# Patient Record
Sex: FEMALE | Race: WHITE | URBAN - METROPOLITAN AREA
[De-identification: names, ages, dates, MRNs, and addresses within clinical notes are randomized per-mention and may not be internally consistent; named-entity substitution may affect disease eponyms.]

---

## 2021-07-13 NOTE — PROGRESS NOTES
Christen Rodriguez  1960   Chief Complaint   Patient presents with    Shoulder Pain     left shoulder pain        HISTORY OF PRESENT ILLNESS  Christen Rodriguez is a 63 y.o. female who presents today for evaluation of left shoulder pain and tingling of the 4th and 5th digits on the left hand. She reports having the pain for years and does not know of an injury or event that brought it on. Her pain is achy and located on the anterior and lateral aspect of her shoulder. This pain does not radiate and she can not pinpoint and particular movement that makes it works. The pain is intermittent and she has taken motrin in the past for this. She has also been through PT in the past for her shoulder. She is currently on Celebrex and doesn't notice much difference in her pain since starting this. She denies any sx or injections for the shoulder. She has been seen by her PCP for her shoulder and neck. xrays were done and she comes in for evaluation today to see what is going on with her shoulder. She is also having tingling of the 4th and 5th digits of the left hand. She has also had this for years and doesn't know when or how it started. The tingling is intermittent and she does have symptoms at night. She has not tried anything to help this so far. Allergies   Allergen Reactions    Vicodin [Hydrocodone-Acetaminophen] Other (comments)     Tongue tingling        History reviewed. No pertinent past medical history.    Social History     Socioeconomic History    Marital status:      Spouse name: Not on file    Number of children: Not on file    Years of education: Not on file    Highest education level: Not on file   Occupational History    Not on file   Tobacco Use    Smoking status: Never Smoker    Smokeless tobacco: Never Used   Substance and Sexual Activity    Alcohol use: Not on file    Drug use: Not on file    Sexual activity: Not on file   Other Topics Concern    Not on file   Social History Narrative    Not on file     Social Determinants of Health     Financial Resource Strain:     Difficulty of Paying Living Expenses:    Food Insecurity:     Worried About Running Out of Food in the Last Year:     [address] in the Last Year:    Transportation Needs:     Lack of Transportation (Medical):  Lack of Transportation (Non-Medical):    Physical Activity:     Days of Exercise per Week:     Minutes of Exercise per Session:    Stress:     Feeling of Stress :    Social Connections:     Frequency of Communication with Friends and Family:     Frequency of Social Gatherings with Friends and Family:     Attends [employer] Services:     Active Member of Clubs or Organizations:     Attends Club or Organization Meetings:     Marital Status:    Intimate Partner Violence:     Fear of Current or Ex-Partner:     Emotionally Abused:     Physically Abused:     Sexually Abused:       Past Surgical History:   Procedure Laterality Date    HX HYSTERECTOMY        No family history on file. Current Outpatient Medications   Medication Sig    lisinopriL (PRINIVIL, ZESTRIL) 40 mg tablet Take 40 mg by mouth daily.  hydroCHLOROthiazide (HYDRODIURIL) 25 mg tablet Take 25 mg by mouth daily.  docusate calcium (Surfak) 240 mg capsule Take 240 mg by mouth two (2) times a day.  polyethylene glycol (MIRALAX) 17 gram packet Take 17 g by mouth daily.  celecoxib (CELEBREX) 100 mg capsule Take  by mouth two (2) times a day.  sodium chloride (AYR SALINE) 0.65 % drop 2 Drops every two (2) hours as needed for Congestion.  diclofenac (Voltaren) 1 % gel Apply 4 g to affected area four (4) times daily. No current facility-administered medications for this visit. REVIEW OF SYSTEM   Patient denies: Weight loss, Fever/Chills, HA, Visual changes, Fatigue, Chest pain, SOB, Abdominal pain, N/V/D/C, Blood in stool or urine, Edema. Pertinent positive as above in HPI.  All others were negative    PHYSICAL EXAM:   Visit Vitals  Pulse 82   Temp 98.3 °F (36.8 °C) (Temporal)   Resp 16   Ht 5' 1\" (1.549 m)   Wt 140 lb 12.8 oz (63.9 kg)   SpO2 97%   BMI 26.60 kg/m²     The patient is a well-developed, well-nourished female   in no acute distress. The patient is alert and oriented times three. The patient is alert and oriented times three. Mood and affect are normal.  LYMPHATIC: lymph nodes are not enlarged and are within normal limits  SKIN: normal in color and non tender to palpation. There are no bruises or abrasions noted. NEUROLOGICAL: Motor sensory exam is within normal limits. Reflexes are equal bilaterally. There is normal sensation to pinprick and light touch  MUSCULOSKELETAL:  Examination Left shoulder   Skin Intact   AC joint tenderness -   Biceps tenderness -   Forward flexion/Elevation    Active abduction    Glenohumeral abduction 90   External rotation ROM 90   Internal rotation ROM 70   Apprehension Not assessed   Vidas Relocation Not assessed   Jerk Not assessed   Load and Shift Not assessed   Obriens Not assessed   Speeds -   Impingement sign -   Supraspinatus/Empty Can ++, 5/5   External Rotation Strength +, 5/5   Lift Off/Belly Press -, 5/5   Neurovascular Intact      Examination Left Elbow   Skin Intact   Range of Motion 0-135   Tenderness Medial Epicondyle -   Tenderness Lateral Epicondyle -   Tenderness Olecranon Bursa -   Swelling -   Bruising -   Stability Normal   Motor Strength  Normal   Neurovascular Intact     tinels and phalens pos at the cubital tunnel    IMAGING: no new imaging today. 4 views of the left shoulder from PCP office reviewed today. Which do some calcific tendonitis of the supraspinatus. IMPRESSION:      ICD-10-CM ICD-9-CM    1.  Calcific tendonitis of left shoulder  M75.32 726.11 MRI SHOULDER LT WO CONT        PLAN:   Pt having chronic left shoulder pain and cubital tunnel syndrome  I think her shoulder pain is coming from calcific tendonitis. I think she is also having some cubital tunnel symptoms as well. Will order an MRI of her left shoulder to evaluate the calcific tendonitis and possibly plan for surgery vs injection for this. She will continue Celebrex and activity modification for discomfort. Will prescribe volteran gel to also help with pain. For the cubital tunnel will work on activity modification for now. If symptoms persists may order some PT and nerve gliding exercises vs EMG if surgical planning decided on.   RTC after MRI      JOSÉ MIGUEL [facility]

## 2021-07-14 ENCOUNTER — OFFICE VISIT (OUTPATIENT)
Dept: ORTHOPEDIC SURGERY | Age: 61
End: 2021-07-14
Payer: OTHER GOVERNMENT

## 2021-07-14 VITALS
HEART RATE: 82 BPM | RESPIRATION RATE: 16 BRPM | TEMPERATURE: 98.3 F | HEIGHT: 61 IN | BODY MASS INDEX: 26.58 KG/M2 | WEIGHT: 140.8 LBS | OXYGEN SATURATION: 97 %

## 2021-07-14 DIAGNOSIS — G56.22 CUBITAL TUNNEL SYNDROME ON LEFT: ICD-10-CM

## 2021-07-14 DIAGNOSIS — M75.32 CALCIFIC TENDONITIS OF LEFT SHOULDER: Primary | ICD-10-CM

## 2021-07-14 DIAGNOSIS — M75.32 CALCIFIC TENDONITIS OF LEFT SHOULDER: ICD-10-CM

## 2021-07-14 PROCEDURE — 99204 OFFICE O/P NEW MOD 45 MIN: CPT | Performed by: ORTHOPAEDIC SURGERY

## 2021-07-14 RX ORDER — DICLOFENAC SODIUM 10 MG/G
4 GEL TOPICAL 4 TIMES DAILY
Qty: 100 G | Refills: 4 | Status: SHIPPED | OUTPATIENT
Start: 2021-07-14 | End: 2022-10-30

## 2021-07-14 RX ORDER — HYDROCHLOROTHIAZIDE 25 MG/1
25 TABLET ORAL DAILY
COMMUNITY

## 2021-07-14 RX ORDER — DOCUSATE CALCIUM 240 MG
240 CAPSULE ORAL 2 TIMES DAILY
COMMUNITY
End: 2022-10-30

## 2021-07-14 RX ORDER — POLYETHYLENE GLYCOL 3350 17 G/17G
17 POWDER, FOR SOLUTION ORAL DAILY
COMMUNITY

## 2021-07-14 RX ORDER — LISINOPRIL 40 MG/1
40 TABLET ORAL DAILY
COMMUNITY
End: 2022-01-11

## 2021-07-14 RX ORDER — CELECOXIB 100 MG/1
CAPSULE ORAL 2 TIMES DAILY
COMMUNITY

## 2022-01-11 ENCOUNTER — OFFICE VISIT (OUTPATIENT)
Dept: ORTHOPEDIC SURGERY | Age: 62
End: 2022-01-11
Payer: OTHER GOVERNMENT

## 2022-01-11 VITALS
TEMPERATURE: 97.7 F | WEIGHT: 142.2 LBS | OXYGEN SATURATION: 100 % | HEIGHT: 61 IN | BODY MASS INDEX: 26.85 KG/M2 | HEART RATE: 77 BPM

## 2022-01-11 DIAGNOSIS — M75.32 CALCIFIC TENDONITIS OF LEFT SHOULDER: Primary | ICD-10-CM

## 2022-01-11 DIAGNOSIS — M75.101 ROTATOR CUFF SYNDROME, RIGHT: ICD-10-CM

## 2022-01-11 PROCEDURE — 99214 OFFICE O/P EST MOD 30 MIN: CPT | Performed by: ORTHOPAEDIC SURGERY

## 2022-01-11 RX ORDER — TELMISARTAN 40 MG/1
40 TABLET ORAL DAILY
COMMUNITY

## 2022-01-11 NOTE — PROGRESS NOTES
Patient: Brittany Monroy                MRN: [id number]       SSN: [id number]  YOB: 1960        AGE: 63 y.o. SEX: female  Body mass index is 26.87 kg/m². PCP: Madhu Hughes NP  01/11/22    CHIEF COMPLAINT: Bilateral shoulder pain     HPI: Brittany Monroy is a 63 y.o. female patient who presents to the office today with bilateral shoulder pain. She has been having left shoulder pain for the past 5 to 6 months. She did have an exacerbation of her symptoms about 4 months ago. She has had x-rays and an MRI. The left shoulder pain is more chronic and has resolved for the most part with physical therapy. She has not had any physical therapy on the right shoulder she is having a similar type pain in the right shoulder. Pain hurts worse with use especially overhead and swimming. She also notes the pain at night occasionally with sleeping. Past Medical History:   Diagnosis Date    Asthma     Hypertension        History reviewed. No pertinent family history. Current Outpatient Medications   Medication Sig Dispense Refill    telmisartan (Micardis) 40 mg tablet Take 40 mg by mouth daily.  hydroCHLOROthiazide (HYDRODIURIL) 25 mg tablet Take 25 mg by mouth daily.  docusate calcium (Surfak) 240 mg capsule Take 240 mg by mouth two (2) times a day.  polyethylene glycol (MIRALAX) 17 gram packet Take 17 g by mouth daily.  celecoxib (CELEBREX) 100 mg capsule Take  by mouth two (2) times a day.  sodium chloride (AYR SALINE) 0.65 % drop 2 Drops every two (2) hours as needed for Congestion.  diclofenac (Voltaren) 1 % gel Apply 4 g to affected area four (4) times daily. 100 g 4    lisinopriL (PRINIVIL, ZESTRIL) 40 mg tablet Take 40 mg by mouth daily.  (Patient not taking: Reported on 1/11/2022)         Allergies   Allergen Reactions    Seasonale [Levonorgestrel-Ethinyl Estrad] Unknown (comments)    Vicodin [Hydrocodone-Acetaminophen] Other (comments) Tongue tingling       Past Surgical History:   Procedure Laterality Date    HX HYSTERECTOMY      OH REVAGINAL PROLAPSE,UTEROSACRAL         Social History     Socioeconomic History    Marital status:      Spouse name: Not on file    Number of children: Not on file    Years of education: Not on file    Highest education level: Not on file   Occupational History    Not on file   Tobacco Use    Smoking status: Never Smoker    Smokeless tobacco: Never Used   Vaping Use    Vaping Use: Never used   Substance and Sexual Activity    Alcohol use: Not on file    Drug use: Not on file    Sexual activity: Not on file   Other Topics Concern    Not on file   Social History Narrative    Not on file     Social Determinants of Health     Financial Resource Strain:     Difficulty of Paying Living Expenses: Not on file   Food Insecurity:     Worried About Running Out of Food in the Last Year: Not on file    Loki of Food in the Last Year: Not on file   Transportation Needs:     Lack of Transportation (Medical): Not on file    Lack of Transportation (Non-Medical):  Not on file   Physical Activity:     Days of Exercise per Week: Not on file    Minutes of Exercise per Session: Not on file   Stress:     Feeling of Stress : Not on file   Social Connections:     Frequency of Communication with Friends and Family: Not on file    Frequency of Social Gatherings with Friends and Family: Not on file    Attends Anabaptist Services: Not on file    Active Member of Clubs or Organizations: Not on file    Attends Club or Organization Meetings: Not on file    Marital Status: Not on file   Intimate Partner Violence:     Fear of Current or Ex-Partner: Not on file    Emotionally Abused: Not on file    Physically Abused: Not on file    Sexually Abused: Not on file   Housing Stability:     Unable to Pay for Housing in the Last Year: Not on file    Number of Places Lived in the Last Year: Not on file    Unstable Housing in the Last Year: Not on file       REVIEW OF SYSTEMS:      CON: negative for recent weight loss/gain, fever, or chills  EYE: negative for double or blurry vision  ENT: negative for hoarseness  RS:   negative for cough, URI, SOB  CV:  negative for chest pain, palpitations  GI:    negative for blood in stool, nausea/vomiting  :  negative for blood in urine  MS: As per HPI  Other systems reviewed and noted below. PHYSICAL EXAMINATION:  Visit Vitals  Pulse 77   Temp 97.7 °F (36.5 °C) (Temporal)   Ht 5' 1\" (1.549 m)   Wt 142 lb 3.2 oz (64.5 kg)   SpO2 100%   BMI 26.87 kg/m²     Body mass index is 26.87 kg/m². GENERAL: Alert and oriented x3, in no acute distress, well-developed, well-nourished. HEENT: Normocephalic, atraumatic. RESP: Non labored breathing with equal chest rise on inspiration. CV: Well perfused extremities. No cyanosis or clubbing noted. ABDOMEN: Soft, non-tender, non-distended. Shoulder Examination     R   L  ROM   FF  Full   Full  ER  Full   Full   IR  Full   Full  Rotator Cuff Pain   Supra  -   +   Infra  -   -   Subscap -   -  Crepitus  -   -  Effusion  -   -  Warmth  -   -   Erythema  -   -  Instability  -   -  AC Joint TTP  -   -  Clavicle   Deformity -   -   TTP  -   -  Proximal Humerus   Deformity -   -   TTP  -   -  Deltoid Strength 5   5  Biceps Strength 5   5  Biceps Deformity -   -  Biceps Groove Pain -   -  Impingement Sign -   -       IMAGING:  Previously taken x-rays were reviewed in the office which show calcific tendinitis of the rotator cuff    An MRI of the left shoulder was also reviewed. This does not show any full-thickness rotator cuff tears. There is calcific deposit in the infraspinatus tendon    ASSESSMENT & PLAN  Diagnosis: Left shoulder calcific tendinitis, right shoulder pain    Ben Pares likely has rotator cuff pathology on both shoulders. I do not have any imaging and did not get any today of her right shoulder as her symptoms are very mild.   Her left shoulder has known calcific tendinitis which had a flare several months ago but has seemed to settle down with physical therapy and conservative treatment. At this time I would continue with conservative treatment and instructed her to continue with home exercise program.  I will plan to see her back as needed should her symptoms worsen. Electronically signed by: Jhony Antonio MD    Note: This note was completed using voice recognition software.   Any typographical/name errors or mistakes are unintentional.

## 2022-10-19 ENCOUNTER — HOSPITAL ENCOUNTER (OUTPATIENT)
Dept: LAB | Age: 62
Discharge: HOME | End: 2022-10-19
Payer: OTHER GOVERNMENT

## 2022-10-19 ENCOUNTER — OFFICE VISIT (OUTPATIENT)
Dept: HEMATOLOGY | Age: 62
End: 2022-10-19
Payer: OTHER GOVERNMENT

## 2022-10-19 VITALS
OXYGEN SATURATION: 99 % | SYSTOLIC BLOOD PRESSURE: 123 MMHG | DIASTOLIC BLOOD PRESSURE: 78 MMHG | TEMPERATURE: 98 F | BODY MASS INDEX: 27.19 KG/M2 | HEART RATE: 101 BPM | WEIGHT: 144 LBS | HEIGHT: 61 IN

## 2022-10-19 DIAGNOSIS — K76.89 LIVER CYST: ICD-10-CM

## 2022-10-19 DIAGNOSIS — K76.89 LIVER CYST: Primary | ICD-10-CM

## 2022-10-19 LAB
ALBUMIN SERPL-MCNC: 3.8 G/DL (ref 3.4–5)
ALBUMIN/GLOB SERPL: 1.5 {RATIO} (ref 0.8–1.7)
ALP SERPL-CCNC: 67 U/L (ref 45–117)
ALT SERPL-CCNC: 26 U/L (ref 13–56)
ANION GAP SERPL CALC-SCNC: 7 MMOL/L (ref 3–18)
AST SERPL-CCNC: 22 U/L (ref 10–38)
BASOPHILS # BLD: 0 K/UL (ref 0–0.1)
BASOPHILS NFR BLD: 1 % (ref 0–2)
BILIRUB DIRECT SERPL-MCNC: 0.1 MG/DL (ref 0–0.2)
BILIRUB SERPL-MCNC: 0.5 MG/DL (ref 0.2–1)
BUN SERPL-MCNC: 13 MG/DL (ref 7–18)
BUN/CREAT SERPL: 23 (ref 12–20)
CALCIUM SERPL-MCNC: 9 MG/DL (ref 8.5–10.1)
CHLORIDE SERPL-SCNC: 105 MMOL/L (ref 100–111)
CO2 SERPL-SCNC: 29 MMOL/L (ref 21–32)
CREAT SERPL-MCNC: 0.56 MG/DL (ref 0.6–1.3)
DIFFERENTIAL METHOD BLD: ABNORMAL
EOSINOPHIL # BLD: 0.3 K/UL (ref 0–0.4)
EOSINOPHIL NFR BLD: 6 % (ref 0–5)
ERYTHROCYTE [DISTWIDTH] IN BLOOD BY AUTOMATED COUNT: 12.5 % (ref 11.6–14.5)
GLOBULIN SER CALC-MCNC: 2.6 G/DL (ref 2–4)
GLUCOSE SERPL-MCNC: 85 MG/DL (ref 74–99)
HCT VFR BLD AUTO: 40.1 % (ref 35–45)
HGB BLD-MCNC: 13 G/DL (ref 12–16)
IMM GRANULOCYTES # BLD AUTO: 0 K/UL (ref 0–0.04)
IMM GRANULOCYTES NFR BLD AUTO: 1 % (ref 0–0.5)
INR PPP: 1 (ref 0.8–1.2)
LYMPHOCYTES # BLD: 1.4 K/UL (ref 0.9–3.6)
LYMPHOCYTES NFR BLD: 29 % (ref 21–52)
MCH RBC QN AUTO: 26.5 PG (ref 24–34)
MCHC RBC AUTO-ENTMCNC: 32.4 G/DL (ref 31–37)
MCV RBC AUTO: 81.8 FL (ref 78–100)
MONOCYTES # BLD: 0.4 K/UL (ref 0.05–1.2)
MONOCYTES NFR BLD: 8 % (ref 3–10)
NEUTS SEG # BLD: 2.6 K/UL (ref 1.8–8)
NEUTS SEG NFR BLD: 56 % (ref 40–73)
NRBC # BLD: 0 K/UL (ref 0–0.01)
NRBC BLD-RTO: 0 PER 100 WBC
PLATELET # BLD AUTO: 179 K/UL (ref 135–420)
PMV BLD AUTO: 10.3 FL (ref 9.2–11.8)
POTASSIUM SERPL-SCNC: 3 MMOL/L (ref 3.5–5.5)
PROT SERPL-MCNC: 6.4 G/DL (ref 6.4–8.2)
PROTHROMBIN TIME: 13.5 SEC (ref 11.5–15.2)
RBC # BLD AUTO: 4.9 M/UL (ref 4.2–5.3)
SODIUM SERPL-SCNC: 141 MMOL/L (ref 136–145)
WBC # BLD AUTO: 4.8 K/UL (ref 4.6–13.2)

## 2022-10-19 PROCEDURE — 85025 COMPLETE CBC W/AUTO DIFF WBC: CPT

## 2022-10-19 PROCEDURE — 80076 HEPATIC FUNCTION PANEL: CPT

## 2022-10-19 PROCEDURE — 82378 CARCINOEMBRYONIC ANTIGEN: CPT

## 2022-10-19 PROCEDURE — 86301 IMMUNOASSAY TUMOR CA 19-9: CPT

## 2022-10-19 PROCEDURE — 87340 HEPATITIS B SURFACE AG IA: CPT

## 2022-10-19 PROCEDURE — 86803 HEPATITIS C AB TEST: CPT

## 2022-10-19 PROCEDURE — 85610 PROTHROMBIN TIME: CPT

## 2022-10-19 PROCEDURE — 80048 BASIC METABOLIC PNL TOTAL CA: CPT

## 2022-10-19 PROCEDURE — 99204 OFFICE O/P NEW MOD 45 MIN: CPT | Performed by: INTERNAL MEDICINE

## 2022-10-19 PROCEDURE — 82107 ALPHA-FETOPROTEIN L3: CPT

## 2022-10-19 PROCEDURE — 36415 COLL VENOUS BLD VENIPUNCTURE: CPT

## 2022-10-19 RX ORDER — DOXYCYCLINE 50 MG/1
50 CAPSULE ORAL 2 TIMES DAILY
COMMUNITY

## 2022-10-19 RX ORDER — POTASSIUM CHLORIDE 750 MG/1
TABLET, FILM COATED, EXTENDED RELEASE ORAL
COMMUNITY

## 2022-10-19 RX ORDER — ACETAMINOPHEN 500 MG
TABLET ORAL DAILY
COMMUNITY

## 2022-10-19 RX ORDER — FEXOFENADINE HCL 60 MG
TABLET ORAL
COMMUNITY

## 2022-10-19 RX ORDER — FLUTICASONE PROPIONATE 100 UG/1
1 POWDER, METERED RESPIRATORY (INHALATION) 2 TIMES DAILY
COMMUNITY

## 2022-10-19 NOTE — PROGRESS NOTES
[facility], MD, FACP, Brett Danielle Gerard, [place]      Tee JOSÉ MIGUEL Watson, [facility] Center[facility]   Rosa Kaiser, [facility]   Saida Jama, FNFILIPPO Kessler FNP-C    Yolande Jarrell, [facility]       Samantha Stock Gonzalo De Henao [address]    at [address], [address] [place] [address], Robert  22.    [phone]    FAX: [address] [place] [address], [address], [address]    [phone]    FAX: [phone]       Patient Care Team:  Albino Santos MD (Surgical Oncology)      Problem List  Date Reviewed: 10/30/2022            Codes Class Noted    Liver cyst ICD-10-CM: K76.89  ICD-9-CM: 573.8  10/30/2022             The clinicians listed above have asked me to see Dave Ramey in consultation regarding a large liver cyst.  The only data I was provided was a MRI report. The patient is a 63 y.o.  female without any history of previous liver disease. She developed SOB and saw pulmonary physician I 5/2022. Spirometry of the lungs was normal.  A Ct scan of the chest demonstrated a liver cyst.    The most recent imaging study was an MRI in 6/2022. This demonstrated a large cyst occupying and replacing the entire let lobe. 6.1 x 9.0 cm and several smaller cysts. She had a cholecystectomy in 8/2012. Imaging prior that operation demonstrated a liver cyst.      No liver cancer makers have been ordered to date or the results are not available to me. The patient has the following symptoms which could be attributed to the liver disorder:    Mid-Back pain.       The patient is not experiencing the following symptoms which are commonly seen in this liver disorder:   fatigue,   pain in the right side over the liver,     The patient completes all daily activities without any functional limitations. ASSESSMENT AND PLAN:  Liver Cyst  The patient has a large cyst which is replacing the entire left lobe of the liver. There are also a few small cysts in the right lobe. Liver transaminases are normal.  ALP is normal.  Liver function is normal.  The platelet count is normal.      Serologic testing for causes of chronic liver disease was negative for HCV, HBV    The patient is complaining of back pain and this is most likely coming from that large cyst.  Given the size, that it is now outside the boundies of the liver it will most likely continue to enlarge. I will refer him to Dr Isai Deleon or [facility] for surgical resection of the entire left lobe/cyst.     Will measure the following serologic cancer markers AFP. CA 19-9. CEA. These were negative. CA 19-9 is pending. Treatment of other medical problems in patients with chronic liver disease  There are no contraindications for the patient to take most medications that are necessary for treatment of other medical issues. Counseling for alcohol in patients with chronic liver disease  The patient was counseled regarding alcohol consumption and the effect of alcohol on chronic liver disease. Vaccinations   Since the patient does not have a chronic liver disease which can lead to liver injury screening for HAV and HBV is not needed. Routine vaccinations against other bacterial and viral agents can be performed as indicated. Annual flu vaccination should be administered if indicated. ALLERGIES  Allergies   Allergen Reactions    Seasonale [Levonorgestrel-Ethinyl Estrad] Unknown (comments)    Vicodin [Hydrocodone-Acetaminophen] Other (comments)     Tongue tingling       MEDICATIONS  Current Outpatient Medications   Medication Sig    fexofenadine (ALLEGRA) 60 mg tablet Take  by mouth. cholecalciferol (VITAMIN D3) (2,000 UNITS /50 MCG) cap capsule Take  by mouth daily.     potassium chloride SR (KLOR-CON 10) 10 mEq tablet Take  by mouth. doxycycline (MONODOX) 50 mg capsule Take 50 mg by mouth two (2) times a day. fluticasone propionate (Flovent Diskus) 100 mcg/actuation dsdv inhaler Take 1 Puff by inhalation two (2) times a day. telmisartan (MICARDIS) 40 mg tablet Take 40 mg by mouth daily. hydroCHLOROthiazide (HYDRODIURIL) 25 mg tablet Take 25 mg by mouth daily. polyethylene glycol (MIRALAX) 17 gram packet Take 17 g by mouth daily. celecoxib (CELEBREX) 100 mg capsule Take  by mouth two (2) times a day. sodium chloride (AYR SALINE) 0.65 % drop 2 Drops every two (2) hours as needed for Congestion. No current facility-administered medications for this visit. SYSTEM REVIEW NOT RELATED TO LIVER DISEASE OR REVIEWED ABOVE:  Constitution systems: Negative for fever, chills, weight gain, weight loss. Eyes: Negative for visual changes. ENT: Negative for sore throat, painful swallowing. Respiratory: Negative for cough, hemoptysis, SOB. Cardiology: Negative for chest pain, palpitations. GI:  Negative for constipation or diarrhea. : Negative for urinary frequency, dysuria, hematuria, nocturia. Skin: Negative for rash. Hematology: Negative for easy bruising, blood clots. Musculo-skelatal: Negative for back pain, muscle pain, weakness. Neurologic: Negative for headaches, dizziness, vertigo, memory problems not related to HE. Psychology: Negative for anxiety, depression. FAMILY HISTORY:  The father  of CVA. The mother Has/had the following chronic disease(s): fatty liver, CVA. SOCIAL HISTORY:  The patient is . The patient has 1 child. The patient has never used tobacco products. The patient has never consumed significant amounts of alcohol. 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        PHYSICAL EXAMINATION:  Visit Vitals  /78   Pulse (!) 101   Temp 98 °F (36.7 °C) (Tympanic)   Ht 5' 1\" (1.549 m) Wt 144 lb (65.3 kg)   SpO2 99%   BMI 27.21 kg/m²       General: No acute distress. Eyes: Sclera anicteric. ENT: No oral lesions. Thyroid normal.  Nodes: No adenopathy. Skin: No spider angiomata. No jaundice. No palmar erythema. Respiratory: Lungs clear to auscultation. Cardiovascular: Regular heart rate. No murmurs. No JVD. Abdomen: Soft non-tender, liver size normal to percussion/palpation. Spleen not palpable. No obvious ascites. Extremities: No edema. No muscle wasting. No gross arthritic changes. Neurologic: Alert and oriented. Cranial nerves grossly intact. No asterixis. LABORATORY STUDIES:  Liver [place] of [address] Units 10/19/2022   WBC 4.6 - 13.2 K/uL 4.8   ANC 1.8 - 8.0 K/UL 2.6   HGB 12.0 - 16.0 g/dL 13.0    - 420 K/uL 179   INR 0.8 - 1.2   1.0   AST 10 - 38 U/L 22   ALT 13 - 56 U/L 26   Alk Phos 45 - 117 U/L 67   Bili, Total 0.2 - 1.0 MG/DL 0.5   Bili, Direct 0.0 - 0.2 MG/DL 0.1   Albumin 3.4 - 5.0 g/dL 3.8   BUN 7.0 - 18 MG/DL 13   Creat 0.6 - 1.3 MG/DL 0.56 (L)   Na 136 - 145 mmol/L 141   K 3.5 - 5.5 mmol/L 3.0 (L)   Cl 100 - 111 mmol/L 105   CO2 21 - 32 mmol/L 29   Glucose 74 - 99 mg/dL 85     Cancer Screening Latest Ref Rng & Units 10/19/2022   AFP, Serum 0.0 - 9.2 ng/mL 2.6   AFP-L3% 0.0 - 9.9 % Comment   CEA ng/mL 0.6     10/2022.  pending      SEROLOGIES:  Serologies Latest Ref Rng & Units 10/19/2022   Hep B Surface Ag <1.00 Index <0.10   Hep B Surface Ag Interp NEG   Negative   Hep C Ab 0.0 - 0.9 s/co ratio <0.1       LIVER HISTOLOGY:  Not available or performed    ENDOSCOPIC PROCEDURES:  Not available or performed    RADIOLOGY:  6/2022. Dynamic MRI of liver. Normal appearing liver. Large cyst replacing the entire left lobe 6.1 x 9.0 cm. Several small cysts in the right lobe. No liver mass lesions. Normal spleen. No dilated bile ducts. No bile duct strictures. No ascites.     OTHER TESTING:  Not available or performed    FOLLOW-UP:  All of the issues listed above in the Assessment and Plan were discussed with the patient. All questions were answered. The patient expressed a clear understanding of the above. She will be referred to Dr Johnathan Woods or Liz Mcguire at [facility]. No follow-up at [facility] is needed. I would be glad to see the patient back for follow-up at any time in the future if the clinical situation changes.       Naldo Mckeon MD  [address] Kevin Fleming [address]  [facility] [place]  22.  [phone]  FAX:  [phone]

## 2022-10-20 LAB
AFP L3 MFR SERPL: NORMAL % (ref 0–9.9)
AFP SERPL-MCNC: 2.6 NG/ML (ref 0–9.2)
CEA SERPL-MCNC: 0.6 NG/ML
HBV SURFACE AG SER QL: <0.1 INDEX
HBV SURFACE AG SER QL: NEGATIVE

## 2022-10-21 LAB
HCV AB S/CO SERPL IA: <0.1 S/CO RATIO (ref 0–0.9)
HCV AB SERPL QL IA: NORMAL

## 2022-10-25 ENCOUNTER — PATIENT MESSAGE (OUTPATIENT)
Dept: HEMATOLOGY | Age: 62
End: 2022-10-25

## 2022-10-30 PROBLEM — K76.89 LIVER CYST: Status: ACTIVE | Noted: 2022-10-30

## 2022-11-01 NOTE — TELEPHONE ENCOUNTER
Ms Elio Stan I know you requested this information in writing. As I informed you on our call today and I believe the staff informed you a couple days ago when you called that due to that fact that you have  they do not accept referrals from specialty to specialty. Your referral has to come from your PCP per  guidelines. I see that Dr Oliva Bucio has provided you with 2 names located at [facility] please contact your PCP and provide them with information. I apologize for any misunderstanding. Thank you!

## 2023-11-24 ENCOUNTER — CLINICAL DOCUMENTATION (OUTPATIENT)
Age: 63
End: 2023-11-24

## 2023-11-24 NOTE — PROGRESS NOTES
11/24/2023    Received OV notes from [facility] 11/17/2023 [facility]    Scanned into media for reference.     efs

## 2024-01-25 ENCOUNTER — CLINICAL DOCUMENTATION (OUTPATIENT)
Age: 64
End: 2024-01-25

## 2024-01-25 NOTE — PROGRESS NOTES
1/25/2024    Rcvd OV notes for [facility] dos 1/15/2024.    Scanned into media for reference.    efs

## 2024-03-05 ENCOUNTER — TELEPHONE (OUTPATIENT)
Age: 64
End: 2024-03-05

## 2024-03-05 NOTE — TELEPHONE ENCOUNTER
Do we have capability to perform a hydogen breath test?  Has potential symptoms of fructin intolerance.  Patient is asking if we can treat her. She passed out when she had an infusion.   Do you know of another provider who can handle this if you cannot?  She has breast cancer, a thyroid nodule and bladder has fallen. Just a little history.    Please advise. I told her I would call her back after asking you about this. She said she is okay with the answer \"no\". She will go wherever she needs to to get treatment.      Dr. Garduno stated that we do not perform these tests here at our facility. He recommends [facility].  Patient was notified of this and said there was somewhere closer in her area that she will check into. Patient understood.

## 2024-03-29 ENCOUNTER — CLINICAL DOCUMENTATION (OUTPATIENT)
Age: 64
End: 2024-03-29

## 2024-03-29 NOTE — PROGRESS NOTES
3/29/79595    Rcvd OV notes from [facility] 3/17/2024.    Scanned into media for reference.    efs